# Patient Record
Sex: FEMALE | Race: WHITE | NOT HISPANIC OR LATINO | URBAN - METROPOLITAN AREA
[De-identification: names, ages, dates, MRNs, and addresses within clinical notes are randomized per-mention and may not be internally consistent; named-entity substitution may affect disease eponyms.]

---

## 2017-04-21 ENCOUNTER — OFFICE (OUTPATIENT)
Dept: URBAN - METROPOLITAN AREA CLINIC 78 | Facility: CLINIC | Age: 32
End: 2017-04-21

## 2017-04-21 PROCEDURE — 00014: CPT

## 2018-02-16 ENCOUNTER — OFFICE (OUTPATIENT)
Dept: URBAN - METROPOLITAN AREA CLINIC 33 | Facility: CLINIC | Age: 33
End: 2018-02-16

## 2018-02-16 VITALS
HEIGHT: 65 IN | SYSTOLIC BLOOD PRESSURE: 131 MMHG | WEIGHT: 123 LBS | HEART RATE: 90 BPM | TEMPERATURE: 98.6 F | DIASTOLIC BLOOD PRESSURE: 74 MMHG

## 2018-02-16 DIAGNOSIS — K51.80 OTHER ULCERATIVE COLITIS WITHOUT COMPLICATIONS: ICD-10-CM

## 2018-02-16 PROCEDURE — 00031: CPT

## 2018-02-16 PROCEDURE — 99215 OFFICE O/P EST HI 40 MIN: CPT

## 2018-02-16 NOTE — SERVICEHPINOTES
FABIO WEINSTEIN   is a   32  female who presents for UC f/u. Her colonoscopy on 8- showed erythema, friability, of sigmoid colon 20 cm to anal verge. 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 Pathology on sigmoid colon showed chronic, active colitis in sigmoid colon acute and chronic inflammatory cells and focal cryptitis noted, no dysplasia, or well formed crypt abscesses.She was then prescribed Lialda which helped her for a short amount of time but then she could no longer afford it so she was switch to sulfasalazine. The sulfasalazine made her symptoms (diarrhea, bleeding) worse and made her feel sick. She has not been taking anything for 1.5 years because she did not have insurance until recently. BMs 8x/day, blood with every BM. Abdominal pain prior to BMs. Last few weeks she has been having bad cramps throughout abdomen with bloating and gas. Fecal urgency and she has had a lot of accidents. She lost about 5lbs since last OV.  Occasional nausea and rare heartburn. She took omeprazole 40mg in the past which she does not recall if it helped her nausea or not. No joint pain, fever, or chills. --She checked with her insurance and lialda is too expensive as she has a large deductible she believes Pentasa may be cheaper but not by much. BR

## 2018-03-15 ENCOUNTER — AMBULATORY SURGICAL CENTER (OUTPATIENT)
Dept: URBAN - METROPOLITAN AREA SURGERY 21 | Facility: SURGERY | Age: 33
End: 2018-03-15

## 2018-03-15 DIAGNOSIS — K51.80 OTHER ULCERATIVE COLITIS WITHOUT COMPLICATIONS: ICD-10-CM

## 2018-03-15 DIAGNOSIS — K51.30 ULCERATIVE (CHRONIC) RECTOSIGMOIDITIS WITHOUT COMPLICATIONS: ICD-10-CM

## 2018-03-15 PROCEDURE — 45380 COLONOSCOPY AND BIOPSY: CPT

## 2018-04-11 LAB
C-REACTIVE PROTEIN: 9.2 MG/L — HIGH (ref ?–8)
CBC (INCLUDES DIFF/PLT): ABSOLUTE BAND NEUTROPHILS: NORMAL CELLS/UL
CBC (INCLUDES DIFF/PLT): ABSOLUTE BASOPHILS: 88 CELLS/UL (ref 0–200)
CBC (INCLUDES DIFF/PLT): ABSOLUTE BLASTS: NORMAL CELLS/UL
CBC (INCLUDES DIFF/PLT): ABSOLUTE EOSINOPHILS: 648 CELLS/UL — HIGH (ref 15–500)
CBC (INCLUDES DIFF/PLT): ABSOLUTE LYMPHOCYTES: 2000 CELLS/UL (ref 850–3900)
CBC (INCLUDES DIFF/PLT): ABSOLUTE METAMYELOCYTES: NORMAL CELLS/UL
CBC (INCLUDES DIFF/PLT): ABSOLUTE MONOCYTES: 608 CELLS/UL (ref 200–950)
CBC (INCLUDES DIFF/PLT): ABSOLUTE MYELOCYTES: NORMAL CELLS/UL
CBC (INCLUDES DIFF/PLT): ABSOLUTE NEUTROPHILS: 4656 CELLS/UL (ref 1500–7800)
CBC (INCLUDES DIFF/PLT): ABSOLUTE NUCLEATED RBC: NORMAL CELLS/UL
CBC (INCLUDES DIFF/PLT): ABSOLUTE PROMYELOCYTES: NORMAL CELLS/UL
CBC (INCLUDES DIFF/PLT): BAND NEUTROPHILS: NORMAL %
CBC (INCLUDES DIFF/PLT): BASOPHILS: 1.1 %
CBC (INCLUDES DIFF/PLT): BLASTS: NORMAL %
CBC (INCLUDES DIFF/PLT): COMMENT(S): NORMAL
CBC (INCLUDES DIFF/PLT): EOSINOPHILS: 8.1 %
CBC (INCLUDES DIFF/PLT): HEMATOCRIT: 38.8 % (ref 35–45)
CBC (INCLUDES DIFF/PLT): HEMOGLOBIN: 13 G/DL (ref 11.7–15.5)
CBC (INCLUDES DIFF/PLT): LYMPHOCYTES: 25 %
CBC (INCLUDES DIFF/PLT): MCH: 30 PG (ref 27–33)
CBC (INCLUDES DIFF/PLT): MCHC: 33.5 G/DL (ref 32–36)
CBC (INCLUDES DIFF/PLT): MCV: 89.4 FL (ref 80–100)
CBC (INCLUDES DIFF/PLT): METAMYELOCYTES: NORMAL %
CBC (INCLUDES DIFF/PLT): MONOCYTES: 7.6 %
CBC (INCLUDES DIFF/PLT): MPV: 9 FL (ref 7.5–12.5)
CBC (INCLUDES DIFF/PLT): MYELOCYTES: NORMAL %
CBC (INCLUDES DIFF/PLT): NEUTROPHILS: 58.2 %
CBC (INCLUDES DIFF/PLT): NUCLEATED RBC: NORMAL /100 WBC
CBC (INCLUDES DIFF/PLT): PLATELET COUNT: 343 THOUSAND/UL (ref 140–400)
CBC (INCLUDES DIFF/PLT): PROMYELOCYTES: NORMAL %
CBC (INCLUDES DIFF/PLT): RDW: 12.9 % (ref 11–15)
CBC (INCLUDES DIFF/PLT): REACTIVE LYMPHOCYTES: NORMAL %
CBC (INCLUDES DIFF/PLT): RED BLOOD CELL COUNT: 4.34 MILLION/UL (ref 3.8–5.1)
CBC (INCLUDES DIFF/PLT): WHITE BLOOD CELL COUNT: 8 THOUSAND/UL (ref 3.8–10.8)
COMPREHENSIVE METABOLIC PANEL: ALBUMIN/GLOBULIN RATIO: 1.2 (CALC) (ref 1–2.5)
COMPREHENSIVE METABOLIC PANEL: ALBUMIN: 4.1 G/DL (ref 3.6–5.1)
COMPREHENSIVE METABOLIC PANEL: ALKALINE PHOSPHATASE: 45 U/L (ref 33–115)
COMPREHENSIVE METABOLIC PANEL: ALT: 11 U/L (ref 6–29)
COMPREHENSIVE METABOLIC PANEL: AST: 17 U/L (ref 10–30)
COMPREHENSIVE METABOLIC PANEL: BILIRUBIN, TOTAL: 0.3 MG/DL (ref 0.2–1.2)
COMPREHENSIVE METABOLIC PANEL: BUN/CREATININE RATIO: (no result) (CALC)
COMPREHENSIVE METABOLIC PANEL: CALCIUM: 9.2 MG/DL (ref 8.6–10.2)
COMPREHENSIVE METABOLIC PANEL: CARBON DIOXIDE: 29 MMOL/L (ref 20–31)
COMPREHENSIVE METABOLIC PANEL: CHLORIDE: 101 MMOL/L (ref 98–110)
COMPREHENSIVE METABOLIC PANEL: CREATININE: 0.82 MG/DL (ref 0.5–1.1)
COMPREHENSIVE METABOLIC PANEL: EGFR AFRICAN AMERICAN: 111 ML/MIN/1.73M2 (ref 60–?)
COMPREHENSIVE METABOLIC PANEL: EGFR NON-AFR. AMERICAN: 96 ML/MIN/1.73M2 (ref 60–?)
COMPREHENSIVE METABOLIC PANEL: GLOBULIN: 3.4 G/DL (CALC) (ref 1.9–3.7)
COMPREHENSIVE METABOLIC PANEL: GLUCOSE: 84 MG/DL (ref 65–139)
COMPREHENSIVE METABOLIC PANEL: POTASSIUM: 4 MMOL/L (ref 3.5–5.3)
COMPREHENSIVE METABOLIC PANEL: PROTEIN, TOTAL: 7.5 G/DL (ref 6.1–8.1)
COMPREHENSIVE METABOLIC PANEL: SODIUM: 133 MMOL/L — LOW (ref 135–146)
COMPREHENSIVE METABOLIC PANEL: UREA NITROGEN (BUN): 13 MG/DL (ref 7–25)
FERRITIN: 16 NG/ML (ref 10–154)
FOLATE, SERUM: >24 NG/ML
IRON AND TOTAL IRON BINDING CAPACITY: % SATURATION: 61 % (CALC) — HIGH (ref 11–50)
IRON AND TOTAL IRON BINDING CAPACITY: IRON BINDING CAPACITY: 471 MCG/DL (CALC) — HIGH (ref 250–450)
IRON AND TOTAL IRON BINDING CAPACITY: IRON, TOTAL: 286 MCG/DL — HIGH (ref 40–190)
SED RATE BY MODIFIED WESTERGREN: 31 MM/H — HIGH (ref ?–20)
VITAMIN B12: 609 PG/ML (ref 200–1100)
VITAMIN D,25-OH,TOTAL,IA: 30 NG/ML (ref 30–100)

## 2018-04-16 ENCOUNTER — OFFICE (OUTPATIENT)
Dept: URBAN - METROPOLITAN AREA CLINIC 33 | Facility: CLINIC | Age: 33
End: 2018-04-16

## 2018-04-16 VITALS
WEIGHT: 128 LBS | HEART RATE: 95 BPM | HEIGHT: 65 IN | DIASTOLIC BLOOD PRESSURE: 70 MMHG | TEMPERATURE: 97.9 F | SYSTOLIC BLOOD PRESSURE: 117 MMHG

## 2018-04-16 DIAGNOSIS — K51.80 OTHER ULCERATIVE COLITIS WITHOUT COMPLICATIONS: ICD-10-CM

## 2018-04-16 PROCEDURE — 99214 OFFICE O/P EST MOD 30 MIN: CPT

## 2018-04-16 RX ORDER — PREDNISONE 10 MG/1
TABLET ORAL
Qty: 105 | Refills: 0 | Status: COMPLETED
Start: 2018-04-16 | End: 2019-02-06

## 2018-04-16 RX ORDER — HYDROCORTISONE 100 MG/60ML
ENEMA RECTAL
Qty: 30 | Refills: 1 | Status: COMPLETED
Start: 2018-04-16 | End: 2019-02-06

## 2018-04-16 NOTE — SERVICEHPINOTES
FABIO WEINSTEIN   is a   32  female who presents for UC f/u. Colonoscopy done 3/15/18 revealed diffuse ulcerative colitis, sparing the cecum and ascending colon. Biopsies showed: normal cecum and ascending colon, transverse - min active chronic acute colitis (cac) w/ focal cryptitis. descending - quiescent colitis. sigmoid/rectum - minimally active cac / focal cryptitis (sig). Distal sigmoid - ulcer. Prior hx:Kimberly has a hx of UC, first dx in 2016--8- showed erythema, friability, of sigmoid colon 20 cm to anal verge. 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 Pathology on sigmoid colon showed chronic, active colitis in sigmoid colon acute and chronic inflammatory cells and focal cryptitis noted, no dysplasia, or well formed crypt abscesses.She was on Lialda when she was first diagnosed but then did not have insurance so she could no longer afford it. She has tried sulfasalazines but they made her symptoms worse. So, she has not been on anything for 1.5 years. She now has insurance but has a high deductible. [facility] states she is still having up to 8 BMs/day, (having BM with stool 4-5x/day) with rectal bleeding, mucus, urgency, bloating, and gas. BSS type 1, 6-7. She denies any fever, joint pain, rashes, or chills. She has not yet completed fecal calprotectin as ordered at last OV.BRLabs showed elevated iron levels--she is taking oral iron.BR

## 2018-05-22 ENCOUNTER — OFFICE (OUTPATIENT)
Dept: URBAN - METROPOLITAN AREA CLINIC 33 | Facility: CLINIC | Age: 33
End: 2018-05-22

## 2018-05-22 VITALS
WEIGHT: 129 LBS | SYSTOLIC BLOOD PRESSURE: 120 MMHG | TEMPERATURE: 97.9 F | HEIGHT: 65 IN | HEART RATE: 87 BPM | DIASTOLIC BLOOD PRESSURE: 72 MMHG

## 2018-05-22 DIAGNOSIS — K62.5 HEMORRHAGE OF ANUS AND RECTUM: ICD-10-CM

## 2018-05-22 DIAGNOSIS — R19.7 DIARRHEA, UNSPECIFIED: ICD-10-CM

## 2018-05-22 DIAGNOSIS — K51.50 LEFT SIDED COLITIS WITHOUT COMPLICATIONS: ICD-10-CM

## 2018-05-22 PROCEDURE — 99214 OFFICE O/P EST MOD 30 MIN: CPT

## 2018-05-22 RX ORDER — PREDNISONE 10 MG/1
TABLET ORAL
Qty: 56 | Refills: 1 | Status: COMPLETED
Start: 2018-05-22 | End: 2019-02-06

## 2018-09-26 ENCOUNTER — OFFICE (OUTPATIENT)
Dept: URBAN - METROPOLITAN AREA CLINIC 33 | Facility: CLINIC | Age: 33
End: 2018-09-26
Payer: COMMERCIAL

## 2018-09-26 VITALS
TEMPERATURE: 97.9 F | WEIGHT: 123 LBS | DIASTOLIC BLOOD PRESSURE: 83 MMHG | SYSTOLIC BLOOD PRESSURE: 111 MMHG | HEART RATE: 95 BPM | HEIGHT: 65 IN

## 2018-09-26 DIAGNOSIS — K52.3 INDETERMINATE COLITIS: ICD-10-CM

## 2018-09-26 PROCEDURE — 99214 OFFICE O/P EST MOD 30 MIN: CPT

## 2018-09-26 RX ORDER — DIPHENOXYLATE HYDROCHLORIDE AND ATROPINE SULFATE 2.5; .025 MG/1; MG/1
TABLET ORAL
Qty: 60 | Refills: 2 | Status: COMPLETED
Start: 2018-09-26 | End: 2020-10-02

## 2018-09-26 RX ORDER — PREDNISONE 10 MG/1
TABLET ORAL
Qty: 105 | Refills: 0 | Status: COMPLETED
Start: 2018-04-16 | End: 2019-02-06

## 2018-09-26 RX ORDER — AZATHIOPRINE 50 MG/1
TABLET ORAL
Qty: 180 | Refills: 0 | Status: COMPLETED
Start: 2018-09-26 | End: 2019-04-03

## 2018-10-19 ENCOUNTER — OFFICE (OUTPATIENT)
Dept: URBAN - METROPOLITAN AREA CLINIC 33 | Facility: CLINIC | Age: 33
End: 2018-10-19
Payer: COMMERCIAL

## 2018-10-19 VITALS
SYSTOLIC BLOOD PRESSURE: 115 MMHG | HEART RATE: 84 BPM | HEIGHT: 65 IN | TEMPERATURE: 98.1 F | DIASTOLIC BLOOD PRESSURE: 76 MMHG | WEIGHT: 128 LBS

## 2018-10-19 DIAGNOSIS — K51.50 LEFT SIDED COLITIS WITHOUT COMPLICATIONS: ICD-10-CM

## 2018-10-19 DIAGNOSIS — K62.5 HEMORRHAGE OF ANUS AND RECTUM: ICD-10-CM

## 2018-10-19 DIAGNOSIS — R19.7 DIARRHEA, UNSPECIFIED: ICD-10-CM

## 2018-10-19 PROCEDURE — 99214 OFFICE O/P EST MOD 30 MIN: CPT

## 2018-10-19 RX ORDER — OMEPRAZOLE 40 MG/1
CAPSULE, DELAYED RELEASE ORAL
Qty: 30 | Refills: 3 | Status: COMPLETED
Start: 2018-10-19 | End: 2021-03-31

## 2018-10-19 RX ORDER — DIPHENOXYLATE HYDROCHLORIDE AND ATROPINE SULFATE 2.5; .025 MG/1; MG/1
TABLET ORAL
Qty: 60 | Refills: 2 | Status: COMPLETED
Start: 2018-09-26 | End: 2020-10-02

## 2018-10-19 RX ORDER — AZATHIOPRINE 50 MG/1
TABLET ORAL
Qty: 180 | Refills: 0 | Status: COMPLETED
Start: 2018-09-26 | End: 2019-04-03

## 2018-10-29 ENCOUNTER — OFFICE (OUTPATIENT)
Dept: URBAN - METROPOLITAN AREA CLINIC 33 | Facility: CLINIC | Age: 33
End: 2018-10-29
Payer: COMMERCIAL

## 2018-10-29 DIAGNOSIS — K51.50 LEFT SIDED COLITIS WITHOUT COMPLICATIONS: ICD-10-CM

## 2018-10-29 PROCEDURE — 96415 CHEMO IV INFUSION ADDL HR: CPT

## 2018-10-29 PROCEDURE — 99070 SPECIAL SUPPLIES PHYS/QHP: CPT

## 2018-10-29 PROCEDURE — 96413 CHEMO IV INFUSION 1 HR: CPT

## 2018-11-12 ENCOUNTER — OFFICE (OUTPATIENT)
Dept: URBAN - METROPOLITAN AREA CLINIC 33 | Facility: CLINIC | Age: 33
End: 2018-11-12
Payer: COMMERCIAL

## 2018-11-12 DIAGNOSIS — K51.50 LEFT SIDED COLITIS WITHOUT COMPLICATIONS: ICD-10-CM

## 2018-11-12 PROCEDURE — 96413 CHEMO IV INFUSION 1 HR: CPT

## 2018-11-12 PROCEDURE — 96415 CHEMO IV INFUSION ADDL HR: CPT

## 2018-11-12 PROCEDURE — 99070 SPECIAL SUPPLIES PHYS/QHP: CPT

## 2018-12-12 ENCOUNTER — OFFICE (OUTPATIENT)
Dept: URBAN - METROPOLITAN AREA CLINIC 33 | Facility: CLINIC | Age: 33
End: 2018-12-12

## 2018-12-12 DIAGNOSIS — K51.50 LEFT SIDED COLITIS WITHOUT COMPLICATIONS: ICD-10-CM

## 2018-12-12 PROCEDURE — 96413 CHEMO IV INFUSION 1 HR: CPT

## 2018-12-12 PROCEDURE — 96415 CHEMO IV INFUSION ADDL HR: CPT

## 2018-12-12 PROCEDURE — 99070 SPECIAL SUPPLIES PHYS/QHP: CPT

## 2019-02-06 ENCOUNTER — OFFICE (OUTPATIENT)
Dept: URBAN - METROPOLITAN AREA CLINIC 33 | Facility: CLINIC | Age: 34
End: 2019-02-06

## 2019-02-06 DIAGNOSIS — K51.50 LEFT SIDED COLITIS WITHOUT COMPLICATIONS: ICD-10-CM

## 2019-02-06 PROCEDURE — 96415 CHEMO IV INFUSION ADDL HR: CPT

## 2019-02-06 PROCEDURE — 99070 SPECIAL SUPPLIES PHYS/QHP: CPT

## 2019-02-06 PROCEDURE — 96413 CHEMO IV INFUSION 1 HR: CPT

## 2019-03-26 ENCOUNTER — OFFICE (OUTPATIENT)
Dept: URBAN - METROPOLITAN AREA CLINIC 33 | Facility: CLINIC | Age: 34
End: 2019-03-26

## 2019-03-26 VITALS
WEIGHT: 132 LBS | SYSTOLIC BLOOD PRESSURE: 130 MMHG | HEIGHT: 65 IN | TEMPERATURE: 97.7 F | HEART RATE: 95 BPM | DIASTOLIC BLOOD PRESSURE: 91 MMHG

## 2019-03-26 DIAGNOSIS — R19.7 DIARRHEA, UNSPECIFIED: ICD-10-CM

## 2019-03-26 DIAGNOSIS — K62.5 HEMORRHAGE OF ANUS AND RECTUM: ICD-10-CM

## 2019-03-26 DIAGNOSIS — K51.50 LEFT SIDED COLITIS WITHOUT COMPLICATIONS: ICD-10-CM

## 2019-03-26 PROCEDURE — 99214 OFFICE O/P EST MOD 30 MIN: CPT

## 2019-03-28 LAB
CBC (INCLUDES DIFF/PLT): ABSOLUTE BAND NEUTROPHILS: NORMAL CELLS/UL
CBC (INCLUDES DIFF/PLT): ABSOLUTE BASOPHILS: 87 CELLS/UL (ref 0–200)
CBC (INCLUDES DIFF/PLT): ABSOLUTE BLASTS: NORMAL CELLS/UL
CBC (INCLUDES DIFF/PLT): ABSOLUTE EOSINOPHILS: 835 CELLS/UL — HIGH (ref 15–500)
CBC (INCLUDES DIFF/PLT): ABSOLUTE LYMPHOCYTES: 2349 CELLS/UL (ref 850–3900)
CBC (INCLUDES DIFF/PLT): ABSOLUTE METAMYELOCYTES: NORMAL CELLS/UL
CBC (INCLUDES DIFF/PLT): ABSOLUTE MONOCYTES: 740 CELLS/UL (ref 200–950)
CBC (INCLUDES DIFF/PLT): ABSOLUTE MYELOCYTES: NORMAL CELLS/UL
CBC (INCLUDES DIFF/PLT): ABSOLUTE NEUTROPHILS: 4689 CELLS/UL (ref 1500–7800)
CBC (INCLUDES DIFF/PLT): ABSOLUTE NUCLEATED RBC: NORMAL CELLS/UL
CBC (INCLUDES DIFF/PLT): ABSOLUTE PROMYELOCYTES: NORMAL CELLS/UL
CBC (INCLUDES DIFF/PLT): BAND NEUTROPHILS: NORMAL %
CBC (INCLUDES DIFF/PLT): BASOPHILS: 1 %
CBC (INCLUDES DIFF/PLT): BLASTS: NORMAL %
CBC (INCLUDES DIFF/PLT): COMMENT(S): NORMAL
CBC (INCLUDES DIFF/PLT): EOSINOPHILS: 9.6 %
CBC (INCLUDES DIFF/PLT): HEMATOCRIT: 37.7 % (ref 35–45)
CBC (INCLUDES DIFF/PLT): HEMOGLOBIN: 13.2 G/DL (ref 11.7–15.5)
CBC (INCLUDES DIFF/PLT): LYMPHOCYTES: 27 %
CBC (INCLUDES DIFF/PLT): MCH: 30.2 PG (ref 27–33)
CBC (INCLUDES DIFF/PLT): MCHC: 35 G/DL (ref 32–36)
CBC (INCLUDES DIFF/PLT): MCV: 86.3 FL (ref 80–100)
CBC (INCLUDES DIFF/PLT): METAMYELOCYTES: NORMAL %
CBC (INCLUDES DIFF/PLT): MONOCYTES: 8.5 %
CBC (INCLUDES DIFF/PLT): MPV: 9.2 FL (ref 7.5–12.5)
CBC (INCLUDES DIFF/PLT): MYELOCYTES: NORMAL %
CBC (INCLUDES DIFF/PLT): NEUTROPHILS: 53.9 %
CBC (INCLUDES DIFF/PLT): NUCLEATED RBC: NORMAL /100 WBC
CBC (INCLUDES DIFF/PLT): PLATELET COUNT: 352 THOUSAND/UL (ref 140–400)
CBC (INCLUDES DIFF/PLT): PROMYELOCYTES: NORMAL %
CBC (INCLUDES DIFF/PLT): RDW: 14.2 % (ref 11–15)
CBC (INCLUDES DIFF/PLT): REACTIVE LYMPHOCYTES: NORMAL %
CBC (INCLUDES DIFF/PLT): RED BLOOD CELL COUNT: 4.37 MILLION/UL (ref 3.8–5.1)
CBC (INCLUDES DIFF/PLT): WHITE BLOOD CELL COUNT: 8.7 THOUSAND/UL (ref 3.8–10.8)
COMPREHENSIVE METABOLIC PANEL: ALBUMIN/GLOBULIN RATIO: 1.3 (CALC) (ref 1–2.5)
COMPREHENSIVE METABOLIC PANEL: ALBUMIN: 4.2 G/DL (ref 3.6–5.1)
COMPREHENSIVE METABOLIC PANEL: ALKALINE PHOSPHATASE: 40 U/L (ref 33–115)
COMPREHENSIVE METABOLIC PANEL: ALT: 10 U/L (ref 6–29)
COMPREHENSIVE METABOLIC PANEL: AST: 18 U/L (ref 10–30)
COMPREHENSIVE METABOLIC PANEL: BILIRUBIN, TOTAL: 0.4 MG/DL (ref 0.2–1.2)
COMPREHENSIVE METABOLIC PANEL: BUN/CREATININE RATIO: (no result) (CALC)
COMPREHENSIVE METABOLIC PANEL: CALCIUM: 9 MG/DL (ref 8.6–10.2)
COMPREHENSIVE METABOLIC PANEL: CARBON DIOXIDE: 27 MMOL/L (ref 20–32)
COMPREHENSIVE METABOLIC PANEL: CHLORIDE: 104 MMOL/L (ref 98–110)
COMPREHENSIVE METABOLIC PANEL: CREATININE: 0.8 MG/DL (ref 0.5–1.1)
COMPREHENSIVE METABOLIC PANEL: EGFR AFRICAN AMERICAN: 114 ML/MIN/1.73M2 (ref 60–?)
COMPREHENSIVE METABOLIC PANEL: EGFR NON-AFR. AMERICAN: 98 ML/MIN/1.73M2 (ref 60–?)
COMPREHENSIVE METABOLIC PANEL: GLOBULIN: 3.2 G/DL (CALC) (ref 1.9–3.7)
COMPREHENSIVE METABOLIC PANEL: GLUCOSE: 96 MG/DL (ref 65–139)
COMPREHENSIVE METABOLIC PANEL: POTASSIUM: 4.1 MMOL/L (ref 3.5–5.3)
COMPREHENSIVE METABOLIC PANEL: PROTEIN, TOTAL: 7.4 G/DL (ref 6.1–8.1)
COMPREHENSIVE METABOLIC PANEL: SODIUM: 137 MMOL/L (ref 135–146)
COMPREHENSIVE METABOLIC PANEL: UREA NITROGEN (BUN): 12 MG/DL (ref 7–25)
VITAMIN D,25-OH,TOTAL,IA: 38 NG/ML (ref 30–100)

## 2019-04-03 ENCOUNTER — OFFICE (OUTPATIENT)
Dept: URBAN - METROPOLITAN AREA CLINIC 33 | Facility: CLINIC | Age: 34
End: 2019-04-03
Payer: COMMERCIAL

## 2019-04-03 DIAGNOSIS — K51.50 LEFT SIDED COLITIS WITHOUT COMPLICATIONS: ICD-10-CM

## 2019-04-03 PROCEDURE — 96413 CHEMO IV INFUSION 1 HR: CPT

## 2019-04-03 PROCEDURE — 96415 CHEMO IV INFUSION ADDL HR: CPT

## 2019-06-03 ENCOUNTER — OFFICE (OUTPATIENT)
Dept: URBAN - METROPOLITAN AREA CLINIC 33 | Facility: CLINIC | Age: 34
End: 2019-06-03
Payer: COMMERCIAL

## 2019-06-03 DIAGNOSIS — K51.50 LEFT SIDED COLITIS WITHOUT COMPLICATIONS: ICD-10-CM

## 2019-06-03 PROCEDURE — 96413 CHEMO IV INFUSION 1 HR: CPT

## 2019-06-03 PROCEDURE — 96415 CHEMO IV INFUSION ADDL HR: CPT

## 2019-06-12 ENCOUNTER — OFFICE (OUTPATIENT)
Dept: URBAN - METROPOLITAN AREA CLINIC 33 | Facility: CLINIC | Age: 34
End: 2019-06-12

## 2019-06-12 DIAGNOSIS — K51.50 LEFT SIDED COLITIS WITHOUT COMPLICATIONS: ICD-10-CM

## 2019-06-12 PROCEDURE — 96413 CHEMO IV INFUSION 1 HR: CPT

## 2019-06-26 ENCOUNTER — OFFICE (OUTPATIENT)
Dept: URBAN - METROPOLITAN AREA CLINIC 33 | Facility: CLINIC | Age: 34
End: 2019-06-26

## 2019-06-26 DIAGNOSIS — K51.50 LEFT SIDED COLITIS WITHOUT COMPLICATIONS: ICD-10-CM

## 2019-06-26 PROCEDURE — 96413 CHEMO IV INFUSION 1 HR: CPT

## 2019-07-24 ENCOUNTER — OFFICE (OUTPATIENT)
Dept: URBAN - METROPOLITAN AREA CLINIC 33 | Facility: CLINIC | Age: 34
End: 2019-07-24

## 2019-07-24 DIAGNOSIS — K51.50 LEFT SIDED COLITIS WITHOUT COMPLICATIONS: ICD-10-CM

## 2019-07-24 PROCEDURE — 96413 CHEMO IV INFUSION 1 HR: CPT

## 2019-09-18 ENCOUNTER — OFFICE (OUTPATIENT)
Dept: URBAN - METROPOLITAN AREA CLINIC 33 | Facility: CLINIC | Age: 34
End: 2019-09-18
Payer: COMMERCIAL

## 2019-09-18 DIAGNOSIS — K51.50 LEFT SIDED COLITIS WITHOUT COMPLICATIONS: ICD-10-CM

## 2019-09-18 PROCEDURE — 96413 CHEMO IV INFUSION 1 HR: CPT

## 2019-09-23 ENCOUNTER — OFFICE (OUTPATIENT)
Dept: URBAN - METROPOLITAN AREA CLINIC 33 | Facility: CLINIC | Age: 34
End: 2019-09-23

## 2019-09-23 VITALS
TEMPERATURE: 97.3 F | HEIGHT: 65 IN | WEIGHT: 137 LBS | SYSTOLIC BLOOD PRESSURE: 128 MMHG | HEART RATE: 80 BPM | DIASTOLIC BLOOD PRESSURE: 79 MMHG

## 2019-09-23 DIAGNOSIS — K51.50 LEFT SIDED COLITIS WITHOUT COMPLICATIONS: ICD-10-CM

## 2019-09-23 DIAGNOSIS — R10.32 LEFT LOWER QUADRANT PAIN: ICD-10-CM

## 2019-09-23 DIAGNOSIS — K62.5 HEMORRHAGE OF ANUS AND RECTUM: ICD-10-CM

## 2019-09-23 PROCEDURE — 99214 OFFICE O/P EST MOD 30 MIN: CPT

## 2019-09-23 RX ORDER — MERCAPTOPURINE 50 MG/1
TABLET ORAL
Qty: 90 | Refills: 2 | Status: ACTIVE
Start: 2019-06-07

## 2019-11-13 ENCOUNTER — OFFICE (OUTPATIENT)
Dept: URBAN - METROPOLITAN AREA CLINIC 33 | Facility: CLINIC | Age: 34
End: 2019-11-13
Payer: COMMERCIAL

## 2019-11-13 DIAGNOSIS — K51.50 LEFT SIDED COLITIS WITHOUT COMPLICATIONS: ICD-10-CM

## 2019-11-13 PROCEDURE — 96413 CHEMO IV INFUSION 1 HR: CPT

## 2019-11-21 ENCOUNTER — OFFICE (OUTPATIENT)
Dept: URBAN - METROPOLITAN AREA PATHOLOGY 17 | Facility: PATHOLOGY | Age: 34
End: 2019-11-21
Payer: COMMERCIAL

## 2019-11-21 ENCOUNTER — OFFICE (OUTPATIENT)
Dept: URBAN - METROPOLITAN AREA CLINIC 32 | Facility: CLINIC | Age: 34
End: 2019-11-21
Payer: COMMERCIAL

## 2019-11-21 VITALS
OXYGEN SATURATION: 98 % | HEIGHT: 65 IN | TEMPERATURE: 99.6 F | WEIGHT: 137 LBS | HEART RATE: 88 BPM | RESPIRATION RATE: 16 BRPM | TEMPERATURE: 99.3 F | SYSTOLIC BLOOD PRESSURE: 135 MMHG | HEART RATE: 96 BPM | DIASTOLIC BLOOD PRESSURE: 91 MMHG | DIASTOLIC BLOOD PRESSURE: 94 MMHG | OXYGEN SATURATION: 97 % | SYSTOLIC BLOOD PRESSURE: 134 MMHG

## 2019-11-21 DIAGNOSIS — K62.5 HEMORRHAGE OF ANUS AND RECTUM: ICD-10-CM

## 2019-11-21 DIAGNOSIS — K52.3 INDETERMINATE COLITIS: ICD-10-CM

## 2019-11-21 DIAGNOSIS — R10.32 LEFT LOWER QUADRANT PAIN: ICD-10-CM

## 2019-11-21 DIAGNOSIS — D12.7 BENIGN NEOPLASM OF RECTOSIGMOID JUNCTION: ICD-10-CM

## 2019-11-21 PROCEDURE — 45338 SIGMOIDOSCOPY W/TUMR REMOVE: CPT

## 2019-11-21 PROCEDURE — 88305 TISSUE EXAM BY PATHOLOGIST: CPT

## 2020-01-08 ENCOUNTER — OFFICE (OUTPATIENT)
Dept: URBAN - METROPOLITAN AREA CLINIC 33 | Facility: CLINIC | Age: 35
End: 2020-01-08
Payer: COMMERCIAL

## 2020-01-08 DIAGNOSIS — K51.50 LEFT SIDED COLITIS WITHOUT COMPLICATIONS: ICD-10-CM

## 2020-01-08 PROCEDURE — 96413 CHEMO IV INFUSION 1 HR: CPT

## 2020-03-04 ENCOUNTER — OFFICE (OUTPATIENT)
Dept: URBAN - METROPOLITAN AREA CLINIC 34 | Facility: CLINIC | Age: 35
End: 2020-03-04
Payer: COMMERCIAL

## 2020-03-04 DIAGNOSIS — K51.50 LEFT SIDED COLITIS WITHOUT COMPLICATIONS: ICD-10-CM

## 2020-03-04 PROCEDURE — 96413 CHEMO IV INFUSION 1 HR: CPT | Performed by: INTERNAL MEDICINE

## 2020-05-01 ENCOUNTER — OFFICE (OUTPATIENT)
Dept: URBAN - METROPOLITAN AREA CLINIC 34 | Facility: CLINIC | Age: 35
End: 2020-05-01
Payer: COMMERCIAL

## 2020-05-01 DIAGNOSIS — K51.50 LEFT SIDED COLITIS WITHOUT COMPLICATIONS: ICD-10-CM

## 2020-05-01 PROCEDURE — 96413 CHEMO IV INFUSION 1 HR: CPT | Performed by: INTERNAL MEDICINE

## 2020-05-07 ENCOUNTER — TELEHEALTH PROVIDED OTHER THAN IN PATIENT'S HOME (OUTPATIENT)
Dept: URBAN - METROPOLITAN AREA TELEHEALTH 3 | Facility: TELEHEALTH | Age: 35
End: 2020-05-07
Payer: COMMERCIAL

## 2020-05-07 VITALS — WEIGHT: 137 LBS | HEIGHT: 65 IN

## 2020-05-07 DIAGNOSIS — K51.50 LEFT SIDED COLITIS WITHOUT COMPLICATIONS: ICD-10-CM

## 2020-05-07 DIAGNOSIS — K58.1 IRRITABLE BOWEL SYNDROME WITH CONSTIPATION: ICD-10-CM

## 2020-05-07 DIAGNOSIS — R53.83 OTHER FATIGUE: ICD-10-CM

## 2020-05-07 PROCEDURE — 99214 OFFICE O/P EST MOD 30 MIN: CPT | Mod: 95 | Performed by: INTERNAL MEDICINE

## 2020-06-26 ENCOUNTER — OFFICE (OUTPATIENT)
Dept: URBAN - METROPOLITAN AREA CLINIC 34 | Facility: CLINIC | Age: 35
End: 2020-06-26
Payer: COMMERCIAL

## 2020-06-26 DIAGNOSIS — K51.50 LEFT SIDED COLITIS WITHOUT COMPLICATIONS: ICD-10-CM

## 2020-06-26 PROCEDURE — 96413 CHEMO IV INFUSION 1 HR: CPT | Performed by: INTERNAL MEDICINE

## 2020-08-21 ENCOUNTER — OFFICE (OUTPATIENT)
Dept: URBAN - METROPOLITAN AREA CLINIC 34 | Facility: CLINIC | Age: 35
End: 2020-08-21

## 2020-08-21 DIAGNOSIS — K51.50 LEFT SIDED COLITIS WITHOUT COMPLICATIONS: ICD-10-CM

## 2020-08-21 PROCEDURE — 96413 CHEMO IV INFUSION 1 HR: CPT | Performed by: INTERNAL MEDICINE

## 2020-10-02 ENCOUNTER — TELEHEALTH PROVIDED OTHER THAN IN PATIENT'S HOME (OUTPATIENT)
Dept: URBAN - METROPOLITAN AREA TELEHEALTH 7 | Facility: TELEHEALTH | Age: 35
End: 2020-10-02
Payer: COMMERCIAL

## 2020-10-02 VITALS — WEIGHT: 130 LBS | HEIGHT: 65 IN

## 2020-10-02 DIAGNOSIS — K51.50 LEFT SIDED COLITIS WITHOUT COMPLICATIONS: ICD-10-CM

## 2020-10-02 DIAGNOSIS — K58.1 IRRITABLE BOWEL SYNDROME WITH CONSTIPATION: ICD-10-CM

## 2020-10-02 PROCEDURE — 99214 OFFICE O/P EST MOD 30 MIN: CPT | Mod: 95 | Performed by: INTERNAL MEDICINE

## 2020-10-14 ENCOUNTER — OFFICE (OUTPATIENT)
Dept: URBAN - METROPOLITAN AREA CLINIC 34 | Facility: CLINIC | Age: 35
End: 2020-10-14

## 2020-10-14 DIAGNOSIS — K51.50 LEFT SIDED COLITIS WITHOUT COMPLICATIONS: ICD-10-CM

## 2020-10-14 PROCEDURE — 96413 CHEMO IV INFUSION 1 HR: CPT | Performed by: INTERNAL MEDICINE

## 2020-12-09 ENCOUNTER — OFFICE (OUTPATIENT)
Dept: URBAN - METROPOLITAN AREA CLINIC 34 | Facility: CLINIC | Age: 35
End: 2020-12-09

## 2020-12-09 DIAGNOSIS — K51.50 LEFT SIDED COLITIS WITHOUT COMPLICATIONS: ICD-10-CM

## 2020-12-09 PROCEDURE — 96413 CHEMO IV INFUSION 1 HR: CPT | Performed by: INTERNAL MEDICINE

## 2021-02-03 ENCOUNTER — OFFICE (OUTPATIENT)
Dept: URBAN - METROPOLITAN AREA CLINIC 34 | Facility: CLINIC | Age: 36
End: 2021-02-03
Payer: COMMERCIAL

## 2021-02-03 DIAGNOSIS — K51.50 LEFT SIDED COLITIS WITHOUT COMPLICATIONS: ICD-10-CM

## 2021-02-03 PROCEDURE — 96413 CHEMO IV INFUSION 1 HR: CPT | Performed by: INTERNAL MEDICINE

## 2021-03-31 ENCOUNTER — OFFICE (OUTPATIENT)
Dept: URBAN - METROPOLITAN AREA CLINIC 34 | Facility: CLINIC | Age: 36
End: 2021-03-31

## 2021-03-31 DIAGNOSIS — K51.50 LEFT SIDED COLITIS WITHOUT COMPLICATIONS: ICD-10-CM

## 2021-03-31 PROCEDURE — 96413 CHEMO IV INFUSION 1 HR: CPT | Performed by: INTERNAL MEDICINE

## 2021-04-30 ENCOUNTER — TELEHEALTH PROVIDED OTHER THAN IN PATIENT'S HOME (OUTPATIENT)
Dept: URBAN - METROPOLITAN AREA TELEHEALTH 7 | Facility: TELEHEALTH | Age: 36
End: 2021-04-30

## 2021-04-30 VITALS — HEIGHT: 65 IN | WEIGHT: 174 LBS

## 2021-04-30 DIAGNOSIS — K51.50 LEFT SIDED COLITIS WITHOUT COMPLICATIONS: ICD-10-CM

## 2021-04-30 DIAGNOSIS — K58.1 IRRITABLE BOWEL SYNDROME WITH CONSTIPATION: ICD-10-CM

## 2021-04-30 PROCEDURE — 99214 OFFICE O/P EST MOD 30 MIN: CPT | Mod: 95 | Performed by: INTERNAL MEDICINE

## 2021-04-30 RX ORDER — MERCAPTOPURINE 50 MG/1
TABLET ORAL
Qty: 90 | Refills: 3 | Status: ACTIVE

## 2021-04-30 NOTE — SERVICEHPINOTES
PATIENT VERIFIED BY DATE OF BIRTH AND NAME. Patient has been consented for this telecommunication visit. Ms. Sandoval is a pleasant 36 yo female where for follow up. Please see clinical summary below:She works at the [employer], originally from [place]. She was diagnosed with left sided UC, and had severe diarrhea and rectal bleeding despite the taking Lialda daily. She was on a prednisone taper. This initially improved with frequency and rectal bleeding. She was having soft / watery, mixed with bright red blood, frequency of 8 - 10 times a day. She was feeling better on combination of Remicade infusions and daily Imuran. She did well with Remicade infusions for a good 8 months. Unfortunately, she developed an infusion reaction (tachycardia, flushing, back pain), so this was stopped.She was then switched over to 6-MP 75 mg po QD + Entyvio infusions. She is feeling much better overall. She's having 2-3 formed BMs every other day. No rectal bleeding. She had a flex sig last , and this revealed an inflammatory polyp which was removed and her colon biopsies revealed much improved colitis.[facility] is still seeing Dr. Banda for her OB.  She is 2.5 weeks away from delivery, third trimester, and has a  scheduled on the May 18th.  She is receiving the infusions with no problems.  She "feels great."10 point review of systems have been reviewed as per HPI and are otherwise negative.

## 2021-06-02 ENCOUNTER — OFFICE (OUTPATIENT)
Dept: URBAN - METROPOLITAN AREA CLINIC 102 | Facility: CLINIC | Age: 36
End: 2021-06-02
Payer: COMMERCIAL

## 2021-06-02 DIAGNOSIS — K51.50 LEFT SIDED COLITIS WITHOUT COMPLICATIONS: ICD-10-CM

## 2021-06-02 PROCEDURE — 96413 CHEMO IV INFUSION 1 HR: CPT | Performed by: INTERNAL MEDICINE

## 2021-07-28 ENCOUNTER — OFFICE (OUTPATIENT)
Dept: URBAN - METROPOLITAN AREA CLINIC 102 | Facility: CLINIC | Age: 36
End: 2021-07-28
Payer: COMMERCIAL

## 2021-07-28 DIAGNOSIS — K51.50 LEFT SIDED COLITIS WITHOUT COMPLICATIONS: ICD-10-CM

## 2021-07-28 PROCEDURE — 96413 CHEMO IV INFUSION 1 HR: CPT | Performed by: INTERNAL MEDICINE

## 2021-09-24 ENCOUNTER — OFFICE (OUTPATIENT)
Dept: URBAN - METROPOLITAN AREA CLINIC 102 | Facility: CLINIC | Age: 36
End: 2021-09-24
Payer: COMMERCIAL

## 2021-09-24 DIAGNOSIS — K51.50 LEFT SIDED COLITIS WITHOUT COMPLICATIONS: ICD-10-CM

## 2021-09-24 PROCEDURE — 96413 CHEMO IV INFUSION 1 HR: CPT | Performed by: INTERNAL MEDICINE

## 2021-11-19 ENCOUNTER — OFFICE (OUTPATIENT)
Dept: URBAN - METROPOLITAN AREA CLINIC 102 | Facility: CLINIC | Age: 36
End: 2021-11-19
Payer: COMMERCIAL

## 2021-11-19 ENCOUNTER — TELEHEALTH PROVIDED OTHER THAN IN PATIENT'S HOME (OUTPATIENT)
Dept: URBAN - METROPOLITAN AREA TELEHEALTH 3 | Facility: TELEHEALTH | Age: 36
End: 2021-11-19
Payer: COMMERCIAL

## 2021-11-19 VITALS — HEIGHT: 65 IN | WEIGHT: 139 LBS

## 2021-11-19 DIAGNOSIS — K51.50 LEFT SIDED COLITIS WITHOUT COMPLICATIONS: ICD-10-CM

## 2021-11-19 DIAGNOSIS — K58.1 IRRITABLE BOWEL SYNDROME WITH CONSTIPATION: ICD-10-CM

## 2021-11-19 DIAGNOSIS — K62.5 HEMORRHAGE OF ANUS AND RECTUM: ICD-10-CM

## 2021-11-19 PROCEDURE — 96413 CHEMO IV INFUSION 1 HR: CPT | Performed by: INTERNAL MEDICINE

## 2021-11-19 PROCEDURE — 99214 OFFICE O/P EST MOD 30 MIN: CPT | Mod: 95 | Performed by: INTERNAL MEDICINE

## 2022-01-14 ENCOUNTER — OFFICE (OUTPATIENT)
Dept: URBAN - METROPOLITAN AREA CLINIC 102 | Facility: CLINIC | Age: 37
End: 2022-01-14

## 2022-01-14 DIAGNOSIS — K51.50 LEFT SIDED COLITIS WITHOUT COMPLICATIONS: ICD-10-CM

## 2022-01-14 PROCEDURE — 00002: CPT | Performed by: INTERNAL MEDICINE

## 2022-01-14 PROCEDURE — 96413 CHEMO IV INFUSION 1 HR: CPT | Performed by: INTERNAL MEDICINE

## 2022-03-11 ENCOUNTER — OFFICE (OUTPATIENT)
Dept: URBAN - METROPOLITAN AREA CLINIC 102 | Facility: CLINIC | Age: 37
End: 2022-03-11
Payer: COMMERCIAL

## 2022-03-11 DIAGNOSIS — K51.50 LEFT SIDED COLITIS WITHOUT COMPLICATIONS: ICD-10-CM

## 2022-03-11 PROCEDURE — 96413 CHEMO IV INFUSION 1 HR: CPT | Performed by: INTERNAL MEDICINE

## 2022-05-25 ENCOUNTER — OFFICE (OUTPATIENT)
Dept: URBAN - METROPOLITAN AREA CLINIC 102 | Facility: CLINIC | Age: 37
End: 2022-05-25
Payer: COMMERCIAL

## 2022-05-25 DIAGNOSIS — K51.50 LEFT SIDED COLITIS WITHOUT COMPLICATIONS: ICD-10-CM

## 2022-05-25 PROCEDURE — 96413 CHEMO IV INFUSION 1 HR: CPT | Performed by: INTERNAL MEDICINE

## 2022-07-20 ENCOUNTER — OFFICE (OUTPATIENT)
Dept: URBAN - METROPOLITAN AREA CLINIC 102 | Facility: CLINIC | Age: 37
End: 2022-07-20
Payer: COMMERCIAL

## 2022-07-20 DIAGNOSIS — K51.50 LEFT SIDED COLITIS WITHOUT COMPLICATIONS: ICD-10-CM

## 2022-07-20 PROCEDURE — 96413 CHEMO IV INFUSION 1 HR: CPT | Performed by: INTERNAL MEDICINE

## 2022-09-12 ENCOUNTER — TELEHEALTH PROVIDED IN PATIENT'S HOME (OUTPATIENT)
Dept: URBAN - METROPOLITAN AREA TELEHEALTH 3 | Facility: TELEHEALTH | Age: 37
End: 2022-09-12
Payer: COMMERCIAL

## 2022-09-12 VITALS — HEIGHT: 65 IN | WEIGHT: 150 LBS

## 2022-09-12 DIAGNOSIS — K51.50 LEFT SIDED COLITIS WITHOUT COMPLICATIONS: ICD-10-CM

## 2022-09-12 PROCEDURE — 99214 OFFICE O/P EST MOD 30 MIN: CPT | Mod: 95 | Performed by: INTERNAL MEDICINE

## 2022-09-12 NOTE — SERVICENOTES
Patient's visit was conducted through video telecommunication. Patient consented before the start of visit as to understanding of privacy concerns, possible technological failure, and their responsibility of carrying out instructions of plan.  Provider was located in their home during this visit.

## 2022-09-12 NOTE — SERVICEHPINOTES
PATIENT VERIFIED BY DATE OF BIRTH AND NAME. Patient has been consented for this telecommunication visit.
gerda lorenz Ms. Sandoval is a pleasant 38 yo female where for follow up. Please see clinical summary below:She is originally from [place]. She was diagnosed with left sided UC, and had severe diarrhea and rectal bleeding despite the taking Lialda daily. She was on a prednisone taper. This initially improved with frequency and rectal bleeding. She was having soft / watery, mixed with bright red blood, frequency of 8 - 10 times a day. She was feeling better on combination of Remicade infusions and daily Imuran. She did well with Remicade infusions for a good 8 months. Unfortunately, she developed an infusion reaction (tachycardia, flushing, back pain), so this was stopped.She was then switched over to 6-MP 75 mg po QD + Entyvio infusions. She is feeling much better overall. She's having 2-3 formed BMs every other day. No rectal bleeding. She had a flex sig last fall, and this revealed an inflammatory polyp which was removed and her colon biopsies revealed much improved colitis.She is still seeing Dr. Banda for her OB. She is doing very well with her almost 3 yo bby, no issues post partum.  Baby is healthy. She "feels great."10 point review of systems have been reviewed as per HPI and are otherwise negative. gerda

## 2022-09-14 ENCOUNTER — OFFICE (OUTPATIENT)
Dept: URBAN - METROPOLITAN AREA CLINIC 102 | Facility: CLINIC | Age: 37
End: 2022-09-14
Payer: COMMERCIAL

## 2022-09-14 DIAGNOSIS — K51.50 LEFT SIDED COLITIS WITHOUT COMPLICATIONS: ICD-10-CM

## 2022-09-14 PROCEDURE — 96413 CHEMO IV INFUSION 1 HR: CPT | Performed by: INTERNAL MEDICINE

## 2022-11-09 ENCOUNTER — OFFICE (OUTPATIENT)
Dept: URBAN - METROPOLITAN AREA CLINIC 102 | Facility: CLINIC | Age: 37
End: 2022-11-09
Payer: COMMERCIAL

## 2022-11-09 DIAGNOSIS — K51.50 LEFT SIDED COLITIS WITHOUT COMPLICATIONS: ICD-10-CM

## 2022-11-09 PROCEDURE — 96413 CHEMO IV INFUSION 1 HR: CPT | Performed by: INTERNAL MEDICINE

## 2023-01-18 ENCOUNTER — OFFICE (OUTPATIENT)
Dept: URBAN - METROPOLITAN AREA CLINIC 102 | Facility: CLINIC | Age: 38
End: 2023-01-18
Payer: COMMERCIAL

## 2023-01-18 ENCOUNTER — OFFICE (OUTPATIENT)
Dept: URBAN - METROPOLITAN AREA CLINIC 102 | Facility: CLINIC | Age: 38
End: 2023-01-18

## 2023-01-18 DIAGNOSIS — K51.50 LEFT SIDED COLITIS WITHOUT COMPLICATIONS: ICD-10-CM

## 2023-01-18 PROCEDURE — 00002: CPT | Performed by: INTERNAL MEDICINE

## 2023-01-18 PROCEDURE — 96413 CHEMO IV INFUSION 1 HR: CPT | Performed by: INTERNAL MEDICINE

## 2023-03-22 ENCOUNTER — OFFICE (OUTPATIENT)
Dept: URBAN - METROPOLITAN AREA CLINIC 102 | Facility: CLINIC | Age: 38
End: 2023-03-22
Payer: COMMERCIAL

## 2023-03-22 DIAGNOSIS — K51.50 LEFT SIDED COLITIS WITHOUT COMPLICATIONS: ICD-10-CM

## 2023-03-22 PROCEDURE — 96413 CHEMO IV INFUSION 1 HR: CPT | Performed by: INTERNAL MEDICINE

## 2023-05-19 ENCOUNTER — OFFICE (OUTPATIENT)
Dept: URBAN - METROPOLITAN AREA CLINIC 102 | Facility: CLINIC | Age: 38
End: 2023-05-19
Payer: COMMERCIAL

## 2023-05-19 DIAGNOSIS — K51.50 LEFT SIDED COLITIS WITHOUT COMPLICATIONS: ICD-10-CM

## 2023-05-19 PROCEDURE — 96413 CHEMO IV INFUSION 1 HR: CPT | Performed by: INTERNAL MEDICINE

## 2023-07-14 ENCOUNTER — OFFICE (OUTPATIENT)
Dept: URBAN - METROPOLITAN AREA CLINIC 102 | Facility: CLINIC | Age: 38
End: 2023-07-14

## 2023-07-14 DIAGNOSIS — K51.50 LEFT SIDED COLITIS WITHOUT COMPLICATIONS: ICD-10-CM

## 2023-07-14 PROCEDURE — 96413 CHEMO IV INFUSION 1 HR: CPT | Performed by: INTERNAL MEDICINE

## 2023-08-04 ENCOUNTER — TELEHEALTH PROVIDED OTHER THAN IN PATIENT'S HOME (OUTPATIENT)
Dept: URBAN - METROPOLITAN AREA TELEHEALTH 12 | Facility: TELEHEALTH | Age: 38
End: 2023-08-04

## 2023-08-04 VITALS — WEIGHT: 134 LBS | HEIGHT: 65 IN

## 2023-08-04 DIAGNOSIS — K51.50 LEFT SIDED COLITIS WITHOUT COMPLICATIONS: ICD-10-CM

## 2023-08-04 DIAGNOSIS — R19.7 DIARRHEA, UNSPECIFIED: ICD-10-CM

## 2023-08-04 PROCEDURE — 99214 OFFICE O/P EST MOD 30 MIN: CPT | Mod: 95 | Performed by: INTERNAL MEDICINE

## 2023-08-04 RX ORDER — MERCAPTOPURINE 50 MG/1
TABLET ORAL
Qty: 90 | Refills: 3 | Status: ACTIVE

## 2023-08-04 NOTE — SERVICEHPINOTES
PATIENT VERIFIED BY DATE OF BIRTH AND NAME. Patient has been consented for this telecommunication visit.
gerda GeorgeElie Sandoval is a pleasant 36 yo female where for follow up. Please see clinical summary below:She is originally from [place]. She was diagnosed with left sided UC, and had severe diarrhea and rectal bleeding despite the taking Lialda daily. She was on a prednisone taper. This initially improved with frequency and rectal bleeding. She was having soft / watery, mixed with bright red blood, frequency of 8 - 10 times a day. She was feeling better on combination of Remicade infusions and daily Imuran. She did well with Remicade infusions for a good 8 months. Unfortunately, she developed an infusion reaction (tachycardia, flushing, back pain), so this was stopped.She was then switched over to 6-MP 75 mg po QD + Entyvio infusions. She is feeling much better overall. She's having 2-3 formed BMs every other day. No rectal bleeding. She had a flex sig last fall, and this revealed an inflammatory polyp which was removed and her colon biopsies revealed much improved colitis.She is still seeing Dr. Banda for her OB. She is doing very well with her almost 1 yo bby, no issues post partum. Baby is healthy. She "feels great."10 point review of systems have been reviewed as per HPI and are otherwise negative. gerda

## 2023-08-04 NOTE — SERVICENOTES
Patient's visit was conducted through [employer] video telecommunication. Patient consented before the start of visit as to understanding of privacy concerns, possible technological failure, and their responsibility of carrying out instructions of plan.

## 2023-09-08 ENCOUNTER — OFFICE (OUTPATIENT)
Dept: URBAN - METROPOLITAN AREA CLINIC 102 | Facility: CLINIC | Age: 38
End: 2023-09-08

## 2023-09-08 DIAGNOSIS — K51.50 LEFT SIDED COLITIS WITHOUT COMPLICATIONS: ICD-10-CM

## 2023-09-08 PROCEDURE — 96413 CHEMO IV INFUSION 1 HR: CPT | Performed by: INTERNAL MEDICINE

## 2023-10-06 ENCOUNTER — OFFICE (OUTPATIENT)
Dept: URBAN - METROPOLITAN AREA CLINIC 102 | Facility: CLINIC | Age: 38
End: 2023-10-06

## 2023-10-06 PROCEDURE — 00031: CPT | Performed by: INTERNAL MEDICINE

## 2023-10-19 ENCOUNTER — OFFICE (OUTPATIENT)
Dept: URBAN - METROPOLITAN AREA CLINIC 98 | Facility: CLINIC | Age: 38
End: 2023-10-19
Payer: COMMERCIAL

## 2023-10-19 VITALS
DIASTOLIC BLOOD PRESSURE: 87 MMHG | SYSTOLIC BLOOD PRESSURE: 126 MMHG | HEART RATE: 105 BPM | RESPIRATION RATE: 12 BRPM | OXYGEN SATURATION: 98 % | HEART RATE: 92 BPM | HEART RATE: 112 BPM | SYSTOLIC BLOOD PRESSURE: 132 MMHG | WEIGHT: 134 LBS | RESPIRATION RATE: 23 BRPM | OXYGEN SATURATION: 99 % | RESPIRATION RATE: 20 BRPM | TEMPERATURE: 98.8 F | DIASTOLIC BLOOD PRESSURE: 67 MMHG | HEIGHT: 65 IN | HEART RATE: 102 BPM | HEART RATE: 115 BPM | DIASTOLIC BLOOD PRESSURE: 71 MMHG | DIASTOLIC BLOOD PRESSURE: 64 MMHG | DIASTOLIC BLOOD PRESSURE: 77 MMHG | RESPIRATION RATE: 31 BRPM | SYSTOLIC BLOOD PRESSURE: 100 MMHG | SYSTOLIC BLOOD PRESSURE: 106 MMHG | HEART RATE: 91 BPM | HEART RATE: 106 BPM | SYSTOLIC BLOOD PRESSURE: 107 MMHG | TEMPERATURE: 98.5 F | OXYGEN SATURATION: 100 % | RESPIRATION RATE: 16 BRPM | RESPIRATION RATE: 17 BRPM | SYSTOLIC BLOOD PRESSURE: 133 MMHG | SYSTOLIC BLOOD PRESSURE: 121 MMHG | SYSTOLIC BLOOD PRESSURE: 137 MMHG | HEART RATE: 113 BPM | DIASTOLIC BLOOD PRESSURE: 80 MMHG | SYSTOLIC BLOOD PRESSURE: 110 MMHG | DIASTOLIC BLOOD PRESSURE: 92 MMHG | HEART RATE: 116 BPM | DIASTOLIC BLOOD PRESSURE: 68 MMHG | HEART RATE: 119 BPM

## 2023-10-19 DIAGNOSIS — R10.32 LEFT LOWER QUADRANT PAIN: ICD-10-CM

## 2023-10-19 DIAGNOSIS — R19.7 DIARRHEA, UNSPECIFIED: ICD-10-CM

## 2023-10-19 LAB
ANATOMIC PATHOLOGY REPORT: (no result)
PDF REPORT: PDF REPORT1: (no result)

## 2023-10-19 RX ORDER — BUDESONIDE 9 MG/1
TABLET, FILM COATED, EXTENDED RELEASE ORAL
Qty: 30 | Refills: 2 | Status: COMPLETED
Start: 2023-10-19 | End: 2024-02-20

## 2023-10-19 RX ADMIN — Medication 2: at 09:25

## 2023-11-03 ENCOUNTER — OFFICE (OUTPATIENT)
Dept: URBAN - METROPOLITAN AREA CLINIC 102 | Facility: CLINIC | Age: 38
End: 2023-11-03
Payer: COMMERCIAL

## 2023-11-03 DIAGNOSIS — K51.50 LEFT SIDED COLITIS WITHOUT COMPLICATIONS: ICD-10-CM

## 2023-11-03 PROCEDURE — 96413 CHEMO IV INFUSION 1 HR: CPT | Performed by: INTERNAL MEDICINE

## 2023-12-15 ENCOUNTER — OFFICE (OUTPATIENT)
Dept: URBAN - METROPOLITAN AREA CLINIC 102 | Facility: CLINIC | Age: 38
End: 2023-12-15

## 2023-12-15 DIAGNOSIS — K51.50 LEFT SIDED COLITIS WITHOUT COMPLICATIONS: ICD-10-CM

## 2023-12-15 PROCEDURE — 96413 CHEMO IV INFUSION 1 HR: CPT | Performed by: INTERNAL MEDICINE

## 2024-02-20 ENCOUNTER — TELEHEALTH PROVIDED OTHER THAN IN PATIENT'S HOME (OUTPATIENT)
Dept: URBAN - METROPOLITAN AREA TELEHEALTH 3 | Facility: TELEHEALTH | Age: 39
End: 2024-02-20

## 2024-02-20 VITALS — HEIGHT: 65 IN | WEIGHT: 133 LBS

## 2024-02-20 DIAGNOSIS — K62.5 HEMORRHAGE OF ANUS AND RECTUM: ICD-10-CM

## 2024-02-20 DIAGNOSIS — K51.50 LEFT SIDED COLITIS WITHOUT COMPLICATIONS: ICD-10-CM

## 2024-02-20 PROCEDURE — 99214 OFFICE O/P EST MOD 30 MIN: CPT | Mod: 95 | Performed by: INTERNAL MEDICINE

## 2024-02-20 NOTE — SERVICEHPINOTES
PATIENT VERIFIED BY DATE OF BIRTH AND NAME. Patient has been consented for this telecommunication visit.
gerda GeorgeElie Sandoval is a pleasant 38 yo female where for follow up. Please see clinical summary below:She is originally from [place]. She was diagnosed with left sided UC, and had severe diarrhea and rectal bleeding despite the taking Lialda daily. She was on a prednisone taper. This initially improved with frequency and rectal bleeding. She was having soft / watery, mixed with bright red blood, frequency of 8 - 10 times a day. She was feeling better on combination of Remicade infusions and daily Imuran. She did well with Remicade infusions for a good 8 months. Unfortunately, she developed an infusion reaction (tachycardia, flushing, back pain), so this was stopped.She was then switched over to 6-MP 75 mg po QD + Entyvio infusions. She eventually lost response to the 6-MP medications.  She has been well for over a year with the Q8 week infusions of Entyvio, however she began having loose watery stools w/ streaks of blood and mucous by the 6 week julio.  She must have the infusion every 6 weeks to maintain remission. 10 point review of systems have been reviewed as per HPI and are otherwise negative.gerda lorenz

## 2024-02-20 NOTE — SERVICENOTES
Patient's visit was conducted through [employer] video telecommunication. Patient consented before the start of visit as to understanding of privacy concerns, possible technological failure, and their responsibility of carrying out instructions of plan.

## 2024-02-26 ENCOUNTER — OFFICE (OUTPATIENT)
Dept: URBAN - METROPOLITAN AREA CLINIC 102 | Facility: CLINIC | Age: 39
End: 2024-02-26

## 2024-02-26 DIAGNOSIS — K51.50 LEFT SIDED COLITIS WITHOUT COMPLICATIONS: ICD-10-CM

## 2024-02-26 PROCEDURE — 96413 CHEMO IV INFUSION 1 HR: CPT | Performed by: INTERNAL MEDICINE

## 2024-04-08 ENCOUNTER — OFFICE (OUTPATIENT)
Dept: URBAN - METROPOLITAN AREA CLINIC 102 | Facility: CLINIC | Age: 39
End: 2024-04-08
Payer: COMMERCIAL

## 2024-04-08 DIAGNOSIS — K51.50 LEFT SIDED COLITIS WITHOUT COMPLICATIONS: ICD-10-CM

## 2024-04-08 PROCEDURE — 96413 CHEMO IV INFUSION 1 HR: CPT | Performed by: INTERNAL MEDICINE

## 2024-05-31 ENCOUNTER — OFFICE (OUTPATIENT)
Dept: URBAN - METROPOLITAN AREA CLINIC 102 | Facility: CLINIC | Age: 39
End: 2024-05-31
Payer: COMMERCIAL

## 2024-05-31 VITALS
SYSTOLIC BLOOD PRESSURE: 115 MMHG | TEMPERATURE: 98.3 F | DIASTOLIC BLOOD PRESSURE: 58 MMHG | HEART RATE: 74 BPM | HEIGHT: 65 IN | SYSTOLIC BLOOD PRESSURE: 133 MMHG | HEART RATE: 76 BPM | OXYGEN SATURATION: 99 % | RESPIRATION RATE: 16 BRPM | TEMPERATURE: 98.4 F | DIASTOLIC BLOOD PRESSURE: 80 MMHG

## 2024-05-31 DIAGNOSIS — K51.50 LEFT SIDED COLITIS WITHOUT COMPLICATIONS: ICD-10-CM

## 2024-05-31 PROCEDURE — 96413 CHEMO IV INFUSION 1 HR: CPT | Performed by: INTERNAL MEDICINE

## 2024-07-17 ENCOUNTER — OFFICE (OUTPATIENT)
Dept: URBAN - METROPOLITAN AREA CLINIC 102 | Facility: CLINIC | Age: 39
End: 2024-07-17
Payer: COMMERCIAL

## 2024-07-17 DIAGNOSIS — K51.50 LEFT SIDED COLITIS WITHOUT COMPLICATIONS: ICD-10-CM

## 2024-07-17 PROCEDURE — 96413 CHEMO IV INFUSION 1 HR: CPT | Performed by: INTERNAL MEDICINE

## 2024-10-25 ENCOUNTER — OFFICE (OUTPATIENT)
Dept: URBAN - METROPOLITAN AREA CLINIC 102 | Facility: CLINIC | Age: 39
End: 2024-10-25
Payer: COMMERCIAL

## 2024-10-25 DIAGNOSIS — K51.50 LEFT SIDED COLITIS WITHOUT COMPLICATIONS: ICD-10-CM

## 2024-10-25 PROCEDURE — 96413 CHEMO IV INFUSION 1 HR: CPT | Performed by: HOSPITALIST

## 2024-12-05 ENCOUNTER — OFFICE (OUTPATIENT)
Dept: URBAN - METROPOLITAN AREA CLINIC 102 | Facility: CLINIC | Age: 39
End: 2024-12-05
Payer: COMMERCIAL

## 2024-12-05 DIAGNOSIS — K51.50 LEFT SIDED COLITIS WITHOUT COMPLICATIONS: ICD-10-CM

## 2024-12-05 PROCEDURE — 96413 CHEMO IV INFUSION 1 HR: CPT | Performed by: INTERNAL MEDICINE

## 2025-01-16 ENCOUNTER — OFFICE (OUTPATIENT)
Dept: URBAN - METROPOLITAN AREA CLINIC 102 | Facility: CLINIC | Age: 40
End: 2025-01-16
Payer: COMMERCIAL

## 2025-01-16 DIAGNOSIS — K51.50 LEFT SIDED COLITIS WITHOUT COMPLICATIONS: ICD-10-CM

## 2025-01-16 PROCEDURE — 96413 CHEMO IV INFUSION 1 HR: CPT | Performed by: INTERNAL MEDICINE

## 2025-02-27 ENCOUNTER — OFFICE (OUTPATIENT)
Dept: URBAN - METROPOLITAN AREA CLINIC 102 | Facility: CLINIC | Age: 40
End: 2025-02-27
Payer: COMMERCIAL

## 2025-02-27 DIAGNOSIS — K51.50 LEFT SIDED COLITIS WITHOUT COMPLICATIONS: ICD-10-CM

## 2025-02-27 PROCEDURE — 96413 CHEMO IV INFUSION 1 HR: CPT | Performed by: INTERNAL MEDICINE

## 2025-04-10 ENCOUNTER — OFFICE (OUTPATIENT)
Dept: URBAN - METROPOLITAN AREA CLINIC 102 | Facility: CLINIC | Age: 40
End: 2025-04-10
Payer: COMMERCIAL

## 2025-04-10 DIAGNOSIS — K51.50 LEFT SIDED COLITIS WITHOUT COMPLICATIONS: ICD-10-CM

## 2025-04-10 PROCEDURE — 96413 CHEMO IV INFUSION 1 HR: CPT | Performed by: INTERNAL MEDICINE

## 2025-05-22 ENCOUNTER — OFFICE (OUTPATIENT)
Dept: URBAN - METROPOLITAN AREA CLINIC 102 | Facility: CLINIC | Age: 40
End: 2025-05-22
Payer: COMMERCIAL

## 2025-05-22 DIAGNOSIS — K51.50 LEFT SIDED COLITIS WITHOUT COMPLICATIONS: ICD-10-CM

## 2025-05-22 PROCEDURE — 96413 CHEMO IV INFUSION 1 HR: CPT | Performed by: INTERNAL MEDICINE

## 2025-07-02 ENCOUNTER — OFFICE (OUTPATIENT)
Dept: URBAN - METROPOLITAN AREA CLINIC 102 | Facility: CLINIC | Age: 40
End: 2025-07-02
Payer: COMMERCIAL

## 2025-07-02 DIAGNOSIS — K51.50 LEFT SIDED COLITIS WITHOUT COMPLICATIONS: ICD-10-CM

## 2025-07-02 PROCEDURE — 96413 CHEMO IV INFUSION 1 HR: CPT | Performed by: INTERNAL MEDICINE

## 2025-08-19 ENCOUNTER — OFFICE (OUTPATIENT)
Dept: URBAN - METROPOLITAN AREA CLINIC 102 | Facility: CLINIC | Age: 40
End: 2025-08-19
Payer: COMMERCIAL

## 2025-08-19 DIAGNOSIS — K51.50 LEFT SIDED COLITIS WITHOUT COMPLICATIONS: ICD-10-CM

## 2025-08-19 PROCEDURE — 96413 CHEMO IV INFUSION 1 HR: CPT | Performed by: INTERNAL MEDICINE
